# Patient Record
Sex: MALE | Race: WHITE
[De-identification: names, ages, dates, MRNs, and addresses within clinical notes are randomized per-mention and may not be internally consistent; named-entity substitution may affect disease eponyms.]

---

## 2023-01-23 ENCOUNTER — APPOINTMENT (OUTPATIENT)
Dept: ORTHOPEDIC SURGERY | Facility: CLINIC | Age: 43
End: 2023-01-23
Payer: MEDICARE

## 2023-01-23 VITALS — BODY MASS INDEX: 25.1 KG/M2 | WEIGHT: 147 LBS | HEIGHT: 64 IN

## 2023-01-23 DIAGNOSIS — Z78.9 OTHER SPECIFIED HEALTH STATUS: ICD-10-CM

## 2023-01-23 PROCEDURE — 73140 X-RAY EXAM OF FINGER(S): CPT | Mod: RT

## 2023-01-23 PROCEDURE — 99203 OFFICE O/P NEW LOW 30 MIN: CPT

## 2023-01-23 RX ORDER — PROPRANOLOL HYDROCHLORIDE 80 MG/1
TABLET ORAL
Refills: 0 | Status: ACTIVE | COMMUNITY

## 2023-01-23 RX ORDER — DESMOPRESSIN ACETATE 0.1 UG/ML
0.01 SPRAY NASAL
Refills: 0 | Status: ACTIVE | COMMUNITY

## 2023-01-23 RX ORDER — VENLAFAXINE HCL 50 MG
TABLET ORAL
Refills: 0 | Status: ACTIVE | COMMUNITY

## 2023-01-23 RX ORDER — QUETIAPINE 400 MG/1
TABLET, FILM COATED ORAL
Refills: 0 | Status: ACTIVE | COMMUNITY

## 2023-01-23 NOTE — DISCUSSION/SUMMARY
[de-identified] : The patient was placed in a dorsal extension splint.  He will keep this on at all times except for bathing\par Recommend he have a hand consult with Dr. Armas or Dr. 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

## 2023-01-23 NOTE — HISTORY OF PRESENT ILLNESS
[Gradual] : gradual [1] : 2 [0] : 0 [Dull/Aching] : dull/aching [Localized] : localized [Rest] : rest [Disabled] : Work status: disabled [de-identified] : Patient is a 42-year-old right-hand-dominant male who noted deformity of the right fifth finger 1 month ago.  He does not recall any injury.  He denies any pain in his finger.  He has been unable to completely extend his PIP P joint.  No catching or locking.  No numbness or tingling.  Seen today with his mother, Tonya [] : Post Surgical Visit: no

## 2023-01-23 NOTE — IMAGING
[de-identified] : Right hand\par No swelling\par The patient is unable to completely extend PIP joint of the fifth finger actively.  There is 15 degree lag.  Full flexion of all the fingers\par No palpable tenderness\par Collaterals are stable\par Sensation is intact\par Intrinsic strength intact\par Radial pulse 2+ [Right] : right fingers [FreeTextEntry1] : Reviewed and interpreted.  Right finger AP, lateral and oblique-flexion deformity PIP joint

## 2023-02-03 ENCOUNTER — APPOINTMENT (OUTPATIENT)
Dept: ORTHOPEDIC SURGERY | Facility: CLINIC | Age: 43
End: 2023-02-03
Payer: MEDICARE

## 2023-02-03 VITALS — HEIGHT: 64 IN | BODY MASS INDEX: 25.1 KG/M2 | WEIGHT: 147 LBS

## 2023-02-03 PROCEDURE — 99213 OFFICE O/P EST LOW 20 MIN: CPT

## 2023-02-03 NOTE — HISTORY OF PRESENT ILLNESS
[3] : 3 [2] : 2 [Dull/Aching] : dull/aching [Constant] : constant [Ice] : ice [de-identified] : R SF injury ?4 weeks ago\par Splinted 2 weeks ago\par \par He is better with splint  [] : no [FreeTextEntry1] : right SF [FreeTextEntry3] : a month ago [FreeTextEntry5] : he banged finger. in splint [de-identified] : activity [de-identified] : 1/23/23 [de-identified] : Dr. Bray [de-identified] : XR

## 2023-02-03 NOTE — PHYSICAL EXAM
[de-identified] : R SF\par Mild Boutinerre def\par Nontender\par Good AROM\par \par Xrays no fracture

## 2023-03-03 ENCOUNTER — APPOINTMENT (OUTPATIENT)
Dept: ORTHOPEDIC SURGERY | Facility: CLINIC | Age: 43
End: 2023-03-03
Payer: MEDICARE

## 2023-03-03 VITALS — WEIGHT: 147 LBS | HEIGHT: 64 IN | BODY MASS INDEX: 25.1 KG/M2

## 2023-03-03 DIAGNOSIS — S63.696A: ICD-10-CM

## 2023-03-03 PROCEDURE — 99213 OFFICE O/P EST LOW 20 MIN: CPT

## 2023-03-03 NOTE — HISTORY OF PRESENT ILLNESS
[3] : 3 [2] : 2 [Dull/Aching] : dull/aching [de-identified] : R SF Boutinnerre\par Better with splint  [FreeTextEntry1] : right SF  [FreeTextEntry5] : pain is better\par  [de-identified] : splint

## 2023-07-14 ENCOUNTER — APPOINTMENT (OUTPATIENT)
Dept: ORTHOPEDIC SURGERY | Facility: CLINIC | Age: 43
End: 2023-07-14
Payer: MEDICARE

## 2023-07-14 VITALS — HEIGHT: 64 IN | WEIGHT: 147 LBS | BODY MASS INDEX: 25.1 KG/M2

## 2023-07-14 DIAGNOSIS — M20.021 BOUTONNIERE DEFORMITY OF RIGHT FINGER(S): ICD-10-CM

## 2023-07-14 PROCEDURE — 99213 OFFICE O/P EST LOW 20 MIN: CPT

## 2023-07-14 NOTE — PHYSICAL EXAM
[de-identified] : R hand\par Nontender\par No swelling at the SF\par Severe atrophy 1st web and intrinsics\par Full PROM\par LImited acitve ext of the SF\par

## 2023-07-14 NOTE — PHYSICAL EXAM
[de-identified] : R hand\par Nontender\par No swelling at the SF\par Severe atrophy 1st web and intrinsics\par Full PROM\par LImited acitve ext of the SF\par

## 2023-07-14 NOTE — HISTORY OF PRESENT ILLNESS
[1] : 2 [0] : 0 [Dull/Aching] : dull/aching [] : yes [de-identified] : R hand pain is better\par Cannot ext the SF [FreeTextEntry1] : right SF  [de-identified] : none

## 2023-07-14 NOTE — HISTORY OF PRESENT ILLNESS
[1] : 2 [0] : 0 [Dull/Aching] : dull/aching [] : yes [de-identified] : R hand pain is better\par Cannot ext the SF [FreeTextEntry1] : right SF  [de-identified] : none

## 2023-07-24 ENCOUNTER — APPOINTMENT (OUTPATIENT)
Dept: NEUROLOGY | Facility: CLINIC | Age: 43
End: 2023-07-24
Payer: MEDICARE

## 2023-07-24 PROCEDURE — 95886 MUSC TEST DONE W/N TEST COMP: CPT

## 2023-07-24 PROCEDURE — 95911 NRV CNDJ TEST 9-10 STUDIES: CPT

## 2023-08-18 ENCOUNTER — APPOINTMENT (OUTPATIENT)
Dept: ORTHOPEDIC SURGERY | Facility: CLINIC | Age: 43
End: 2023-08-18
Payer: MEDICARE

## 2023-08-18 VITALS — HEIGHT: 64 IN | BODY MASS INDEX: 25.1 KG/M2 | WEIGHT: 147 LBS

## 2023-08-18 DIAGNOSIS — G56.21 LESION OF ULNAR NERVE, RIGHT UPPER LIMB: ICD-10-CM

## 2023-08-18 PROCEDURE — 99214 OFFICE O/P EST MOD 30 MIN: CPT

## 2023-08-18 NOTE — ASSESSMENT
[FreeTextEntry1] : I discussed surgery and mom They understands severe ulna nerve compression- and risk of permanent damage They want to proceed with therapy  Return in 8 weeks

## 2023-08-18 NOTE — HISTORY OF PRESENT ILLNESS
[6] : 6 [Dull/Aching] : dull/aching [Tingling] : tingling [de-identified] : EMG shows severe R cubital tunnel [FreeTextEntry1] : R hand

## 2023-08-18 NOTE — PHYSICAL EXAM
[de-identified] : R elbow: Tender at the ulna nerve Pain with flexion +tinels at the ulna nerve +hyperflexion test  R hand: Intrinsic weakness Decreased sensation in the ulna nerve distribution +lumbrical atrophy Boutinere deformity SF

## 2025-09-18 ENCOUNTER — APPOINTMENT (OUTPATIENT)
Dept: MRI IMAGING | Facility: CLINIC | Age: 45
End: 2025-09-18
Payer: MEDICARE

## 2025-09-18 ENCOUNTER — APPOINTMENT (OUTPATIENT)
Dept: ORTHOPEDIC SURGERY | Facility: CLINIC | Age: 45
End: 2025-09-18
Payer: MEDICARE

## 2025-09-18 VITALS — HEIGHT: 64 IN | WEIGHT: 147 LBS | BODY MASS INDEX: 25.1 KG/M2

## 2025-09-18 DIAGNOSIS — S93.411A SPRAIN OF CALCANEOFIBULAR LIGAMENT OF RIGHT ANKLE, INITIAL ENCOUNTER: ICD-10-CM

## 2025-09-18 DIAGNOSIS — S93.491A SPRAIN OF OTHER LIGAMENT OF RIGHT ANKLE, INITIAL ENCOUNTER: ICD-10-CM

## 2025-09-18 PROCEDURE — 73610 X-RAY EXAM OF ANKLE: CPT | Mod: RT

## 2025-09-18 PROCEDURE — 73721 MRI JNT OF LWR EXTRE W/O DYE: CPT | Mod: RT

## 2025-09-18 PROCEDURE — 99214 OFFICE O/P EST MOD 30 MIN: CPT

## 2025-09-19 ENCOUNTER — NON-APPOINTMENT (OUTPATIENT)
Age: 45
End: 2025-09-19